# Patient Record
Sex: MALE | Race: WHITE | NOT HISPANIC OR LATINO
[De-identification: names, ages, dates, MRNs, and addresses within clinical notes are randomized per-mention and may not be internally consistent; named-entity substitution may affect disease eponyms.]

---

## 2020-10-20 ENCOUNTER — TRANSCRIPTION ENCOUNTER (OUTPATIENT)
Age: 80
End: 2020-10-20

## 2021-03-11 ENCOUNTER — TRANSCRIPTION ENCOUNTER (OUTPATIENT)
Age: 81
End: 2021-03-11

## 2023-02-09 ENCOUNTER — NON-APPOINTMENT (OUTPATIENT)
Age: 83
End: 2023-02-09

## 2023-02-10 ENCOUNTER — TRANSCRIPTION ENCOUNTER (OUTPATIENT)
Age: 83
End: 2023-02-10

## 2023-02-11 ENCOUNTER — APPOINTMENT (OUTPATIENT)
Dept: DISASTER EMERGENCY | Facility: HOSPITAL | Age: 83
End: 2023-02-11

## 2023-02-11 ENCOUNTER — OUTPATIENT (OUTPATIENT)
Dept: OUTPATIENT SERVICES | Facility: HOSPITAL | Age: 83
LOS: 1 days | End: 2023-02-11

## 2023-02-11 VITALS
RESPIRATION RATE: 18 BRPM | WEIGHT: 274.92 LBS | OXYGEN SATURATION: 94 % | HEART RATE: 96 BPM | HEIGHT: 68 IN | TEMPERATURE: 99 F | DIASTOLIC BLOOD PRESSURE: 77 MMHG | SYSTOLIC BLOOD PRESSURE: 135 MMHG

## 2023-02-11 VITALS
DIASTOLIC BLOOD PRESSURE: 72 MMHG | OXYGEN SATURATION: 94 % | RESPIRATION RATE: 19 BRPM | TEMPERATURE: 98 F | HEART RATE: 80 BPM | SYSTOLIC BLOOD PRESSURE: 131 MMHG

## 2023-02-11 DIAGNOSIS — U07.1 COVID-19: ICD-10-CM

## 2023-02-11 LAB
ALBUMIN SERPL ELPH-MCNC: 4.2 G/DL — SIGNIFICANT CHANGE UP (ref 3.3–5)
ALP SERPL-CCNC: 38 U/L — LOW (ref 40–120)
ALT FLD-CCNC: 10 U/L — SIGNIFICANT CHANGE UP (ref 4–41)
ANION GAP SERPL CALC-SCNC: 13 MMOL/L — SIGNIFICANT CHANGE UP (ref 7–14)
AST SERPL-CCNC: 14 U/L — SIGNIFICANT CHANGE UP (ref 4–40)
BILIRUB SERPL-MCNC: 1.7 MG/DL — HIGH (ref 0.2–1.2)
BUN SERPL-MCNC: 22 MG/DL — SIGNIFICANT CHANGE UP (ref 7–23)
CALCIUM SERPL-MCNC: 8.6 MG/DL — SIGNIFICANT CHANGE UP (ref 8.4–10.5)
CHLORIDE SERPL-SCNC: 99 MMOL/L — SIGNIFICANT CHANGE UP (ref 98–107)
CO2 SERPL-SCNC: 25 MMOL/L — SIGNIFICANT CHANGE UP (ref 22–31)
CREAT SERPL-MCNC: 1.27 MG/DL — SIGNIFICANT CHANGE UP (ref 0.5–1.3)
EGFR: 57 ML/MIN/1.73M2 — LOW
GLUCOSE SERPL-MCNC: 177 MG/DL — HIGH (ref 70–99)
INR BLD: 1.43 RATIO — HIGH (ref 0.88–1.16)
POTASSIUM SERPL-MCNC: 4.7 MMOL/L — SIGNIFICANT CHANGE UP (ref 3.5–5.3)
POTASSIUM SERPL-SCNC: 4.7 MMOL/L — SIGNIFICANT CHANGE UP (ref 3.5–5.3)
PROT SERPL-MCNC: 7.1 G/DL — SIGNIFICANT CHANGE UP (ref 6–8.3)
PROTHROM AB SERPL-ACNC: 16.6 SEC — HIGH (ref 10.5–13.4)
SODIUM SERPL-SCNC: 137 MMOL/L — SIGNIFICANT CHANGE UP (ref 135–145)

## 2023-02-11 RX ORDER — SODIUM CHLORIDE 9 MG/ML
100 INJECTION INTRAMUSCULAR; INTRAVENOUS; SUBCUTANEOUS
Refills: 0 | Status: COMPLETED | OUTPATIENT
Start: 2023-02-11 | End: 2023-02-11

## 2023-02-11 RX ORDER — REMDESIVIR 5 MG/ML
200 INJECTION INTRAVENOUS ONCE
Refills: 0 | Status: COMPLETED | OUTPATIENT
Start: 2023-02-11 | End: 2023-02-11

## 2023-02-11 RX ADMIN — REMDESIVIR 200 MILLIGRAM(S): 5 INJECTION INTRAVENOUS at 10:50

## 2023-02-11 RX ADMIN — SODIUM CHLORIDE 310 MILLILITER(S): 9 INJECTION INTRAMUSCULAR; INTRAVENOUS; SUBCUTANEOUS at 10:52

## 2023-02-11 NOTE — CHART NOTE - NSCHARTNOTEFT_GEN_A_CORE
DAY #1 Remdesivir    Cc:  Patient here for Remdesivir IV Treatment for Covid-19     Patient presents for first day of Remdesivir infusion.    Patient has history of DVT, DM, sleep apnea, SOB with exertion, pulmonary embolism, HTN, hyperlipidemia. Patient takin Xarelto. vaccinated with pfizer, no booster    Clinical History and positive direct covid test confirmed with patient and via [facility] Clinical Viewer in [facility] on 2/10    Patient symptom start date confirmed as 2/8/23. Symptom: cough, sore throat    Patient oriented to Remdesivir treatment  as well as the need to draw and follow up labs. Consent for treatment was obtained and patient given Remdesivir ([employer]) Patient Information Sheet ([email or web address])      Drug-Drug interactions were checked via [employer] Covid 19 Interactions  ([email or web address]-qyusucepgkeonstf.org/) Yes    Patient has no kidney disease    Patient has no liver disease     Patient is not Pregnant Y/N      Based on high-risk status above, confirmed with patient that they have had a conversation with their referring provider regarding the risks and benefits of Remdesivir Infusion.  All questions answered.  Yes    Pre-infusion vitals obtained  135/77,89, 98.8, 94%    Patient determined to be in no acute respiratory distress and clinically stable for infusion. Yes    Peripheral IV placed and specimens collected and labelled for pick-up. Yes    Patient was infused Remdesivir over 30 minute period, vitals signed checked at 15 minutes and monitored for 1-hour post-infusion. Yes    There is no complications during or post-infusion:       Patient tolerated infusion and given post infusion information including number to call with any concerning symptoms and instructions for follow-up for the next 2 days.

## 2023-02-12 ENCOUNTER — OUTPATIENT (OUTPATIENT)
Dept: OUTPATIENT SERVICES | Facility: HOSPITAL | Age: 83
LOS: 1 days | End: 2023-02-12

## 2023-02-12 ENCOUNTER — APPOINTMENT (OUTPATIENT)
Dept: DISASTER EMERGENCY | Facility: HOSPITAL | Age: 83
End: 2023-02-12

## 2023-02-12 VITALS
RESPIRATION RATE: 16 BRPM | SYSTOLIC BLOOD PRESSURE: 95 MMHG | TEMPERATURE: 99 F | OXYGEN SATURATION: 95 % | DIASTOLIC BLOOD PRESSURE: 61 MMHG | HEART RATE: 82 BPM

## 2023-02-12 VITALS
SYSTOLIC BLOOD PRESSURE: 136 MMHG | TEMPERATURE: 98 F | OXYGEN SATURATION: 94 % | DIASTOLIC BLOOD PRESSURE: 77 MMHG | RESPIRATION RATE: 18 BRPM | HEART RATE: 95 BPM

## 2023-02-12 DIAGNOSIS — U07.1 COVID-19: ICD-10-CM

## 2023-02-12 RX ORDER — SODIUM CHLORIDE 9 MG/ML
100 INJECTION INTRAMUSCULAR; INTRAVENOUS; SUBCUTANEOUS
Refills: 0 | Status: DISCONTINUED | OUTPATIENT
Start: 2023-02-12 | End: 2023-02-26

## 2023-02-12 RX ORDER — REMDESIVIR 5 MG/ML
100 INJECTION INTRAVENOUS ONCE
Refills: 0 | Status: COMPLETED | OUTPATIENT
Start: 2023-02-12 | End: 2023-02-12

## 2023-02-12 RX ADMIN — REMDESIVIR 200 MILLIGRAM(S): 5 INJECTION INTRAVENOUS at 11:14

## 2023-02-12 NOTE — CHART NOTE - NSCHARTNOTEFT_GEN_A_CORE
Day #2 Remdesivir     82y Male with PMHx of DVT, DM, sleep apnea, SOB with exertion, pulmonary embolism (on Xarelto), HTN, hyperlipidemia,  and recent dx of COVID 19+ who presents for second day of three days of outpatient Remdesivir Infusion. Patient has been screened and was deemed to be a candidate.    LABORATORY FINDINGS:    PT/INR - ( 11 Feb 2023 11:39 )   PT: 16.6 sec;   INR: 1.43 ratio    02-11    137  |  99  |  22  ----------------------------<  177<H>  4.7   |  25  |  1.27    Ca    8.6      11 Feb 2023 11:39    TPro  7.1  /  Alb  4.2  /  TBili  1.7<H>  /  DBili  x   /  AST  14  /  ALT  10  /  AlkPhos  38<L>  02-11       Previous days labs reviewed, and  found to be ABNORMAL (elevated bilirubin) and escalated to Dr. Renae Mckeon (infusion clinical leadership).     Pt was approved to proceed with infusion without additional labs.     Pt advised to follow-up with provider for further assessment and treatment recommendations.     Pt. assessed for any new symptoms that might require additional labs (abdominal pain, fluid overload)    New symptoms are not present.     Pt. approved to proceed with infusion.     Consent for treatment was obtained.    Pre-infusion vitals obtained.    Vital Signs Last 24 Hrs  T(C): 36.8 (11 Feb 2023 12:20), Max: 37.6 (11 Feb 2023 11:05)  T(F): 98.2 (11 Feb 2023 12:20), Max: 99.6 (11 Feb 2023 11:05)  HR: 80 (11 Feb 2023 12:20) (79 - 96)  BP: 131/72 (11 Feb 2023 12:20) (128/66 - 135/77)  BP(mean): --  RR: 19 (11 Feb 2023 12:20) (18 - 19)  SpO2: 94% (11 Feb 2023 12:20) (94% - 94%)      Physical Exam/findings:    PE:   Appearance: NAD	  HEENT:  NC/AT  Cardiovascular:  No edema  Respiratory: no use of accessory muscles  Gastrointestinal:  non-distended   Skin: warm and dry  Neurologic: Non-focal  Extremities: Normal range of motion    Patient determined to be in no acute respiratory distress and clinically stable for infusion.     Peripheral IV placed and specimens collected and labelled for pick-up.     Patient was infused Remdesivir over 30 minute period, vitals signed checked at 15 minutes and monitored for 1-hour post-infusion.     Complications during or post-infusion:   no    Patient tolerated infusion and given post infusion information including number to call with any concerning symptoms and instructions for follow-up for the next day. Day #2 Remdesivir     82y Male with PMHx of DVT, DM, sleep apnea, SOB with exertion, pulmonary embolism (on Xarelto), HTN, hyperlipidemia,  and recent dx of COVID 19+ who presents for second day of three days of outpatient Remdesivir Infusion. Patient has been screened and was deemed to be a candidate.    LABORATORY FINDINGS:    PT/INR - ( 11 Feb 2023 11:39 )   PT: 16.6 sec;   INR: 1.43 ratio    02-11    137  |  99  |  22  ----------------------------<  177<H>  4.7   |  25  |  1.27    Ca    8.6      11 Feb 2023 11:39    TPro  7.1  /  Alb  4.2  /  TBili  1.7<H>  /  DBili  x   /  AST  14  /  ALT  10  /  AlkPhos  38<L>  02-11       Previous days labs reviewed, and  found to be ABNORMAL (elevated bilirubin) and escalated to Dr. Renae Mckeon (infusion clinical leadership).     Pt was approved to proceed with infusion without additional labs.     Pt advised to follow-up with provider for further assessment and treatment recommendations.     Pt. assessed for any new symptoms that might require additional labs (abdominal pain, fluid overload)    New symptoms are not present.     Pt. approved to proceed with infusion.     Consent for treatment was obtained.    Pre-infusion vitals obtained.      T(C): 36.9 (12 Feb 2023 10:30), Max: 37.6 (11 Feb 2023 11:05)  T(F): 98.5 (12 Feb 2023 10:30), Max: 99.6 (11 Feb 2023 11:05)  HR: 95 (12 Feb 2023 10:30) (79 - 95)  BP: 136/77 (12 Feb 2023 10:30) (128/66 - 136/77)  BP(mean): --  RR: 18 (12 Feb 2023 10:30) (18 - 19)  SpO2: 94% (12 Feb 2023 10:30) (94% - 94%)    Physical Exam/findings:    PE:   Appearance: NAD	  HEENT:  NC/AT  Cardiovascular:  No edema  Respiratory: no use of accessory muscles  Gastrointestinal:  non-distended   Skin: warm and dry  Neurologic: Non-focal  Extremities: Normal range of motion    Patient determined to be in no acute respiratory distress and clinically stable for infusion.     Peripheral IV placed and specimens collected and labelled for pick-up.     Patient was infused Remdesivir over 30 minute period, vitals signed checked at 15 minutes and monitored for 1-hour post-infusion.     Complications during or post-infusion:   no    Patient tolerated infusion and given post infusion information including number to call with any concerning symptoms and instructions for follow-up for the next day.

## 2023-02-13 ENCOUNTER — APPOINTMENT (OUTPATIENT)
Dept: DISASTER EMERGENCY | Facility: HOSPITAL | Age: 83
End: 2023-02-13

## 2023-02-13 ENCOUNTER — OUTPATIENT (OUTPATIENT)
Dept: OUTPATIENT SERVICES | Facility: HOSPITAL | Age: 83
LOS: 1 days | End: 2023-02-13

## 2023-02-13 VITALS
TEMPERATURE: 99 F | RESPIRATION RATE: 17 BRPM | SYSTOLIC BLOOD PRESSURE: 126 MMHG | HEART RATE: 74 BPM | OXYGEN SATURATION: 94 %

## 2023-02-13 VITALS
SYSTOLIC BLOOD PRESSURE: 132 MMHG | HEART RATE: 78 BPM | DIASTOLIC BLOOD PRESSURE: 76 MMHG | RESPIRATION RATE: 19 BRPM | TEMPERATURE: 98 F | OXYGEN SATURATION: 94 %

## 2023-02-13 DIAGNOSIS — U07.1 COVID-19: ICD-10-CM

## 2023-02-13 RX ORDER — SODIUM CHLORIDE 9 MG/ML
100 INJECTION INTRAMUSCULAR; INTRAVENOUS; SUBCUTANEOUS
Refills: 0 | Status: COMPLETED | OUTPATIENT
Start: 2023-02-13 | End: 2023-02-13

## 2023-02-13 RX ORDER — REMDESIVIR 5 MG/ML
100 INJECTION INTRAVENOUS ONCE
Refills: 0 | Status: COMPLETED | OUTPATIENT
Start: 2023-02-13 | End: 2023-02-13

## 2023-02-13 RX ADMIN — SODIUM CHLORIDE 310 MILLILITER(S): 9 INJECTION INTRAMUSCULAR; INTRAVENOUS; SUBCUTANEOUS at 10:41

## 2023-02-13 RX ADMIN — REMDESIVIR 200 MILLIGRAM(S): 5 INJECTION INTRAVENOUS at 10:40

## 2023-02-13 NOTE — CHART NOTE - PRIOR COVID TREATMENT
DAY #3 Remdesivir    Cc:  Patient here for Remdesivir IV Treatment for Covid-19     Patient presents for day 3 of Remdesivir infusion.      Patient has history of DVT, DM, sleep apnea, SOB with exertion, pulmonary embolism, HTN, hyperlipidemia. Patient taking Xarelto. vaccinated with pfizer, no booster    Clinical History and positive direct covid test confirmed with patient and via [facility] Clinical Viewer in [facility] on 2/10    Patient symptom start date confirmed as 2/8/23. Symptom: cough, sore throat DAY #3 Remdesivir    Cc:  Patient here for Remdesivir IV Treatment for Covid-19     Patient presents for day 3 of Remdesivir infusion.      Patient has history of DVT, DM, sleep apnea, SOB with exertion, pulmonary embolism, HTN, hyperlipidemia. Patient taking Xarelto. vaccinated with pfizer, no booster    Clinical History and positive direct covid test confirmed with patient and via [facility] Clinical Viewer in [facility] on 2/10    Patient symptom start date confirmed as 2/8/23. Symptom: cough, sore throat    CC: Monoclonal Antibody Infusion/COVID 19 Positive  82yMale    exam/findings:  T(C): 37.3 (02-12-23 @ 12:22), Max: 37.3 (02-12-23 @ 12:22)  HR: 82 (02-12-23 @ 12:22) (82 - 96)  BP: 95/61 (02-12-23 @ 12:22) (95/61 - 136/77)  RR: 16 (02-12-23 @ 12:22) (16 - 18)  SpO2: 95% (02-12-23 @ 12:22) (94% - 96%)      PE:   Appearance: NAD	  HEENT:   Normal oral mucosa,   Lymphatic: No lymphadenopathy  Cardiovascular: Normal S1 S2, No JVD, No murmurs, No edema  Respiratory: Lungs clear to auscultation	  Gastrointestinal:  Soft, Non-tender, + BS	  Skin: warm and dry  Neurologic: Non-focal  Extremities: Normal range of motion,

## 2023-02-13 NOTE — CHART NOTE - NSCHARTNOTEFT_GEN_A_CORE
Day #3 Remdesivir     Patient presents for third day of three days of outpatient Remdesivir Infusion.     Labs were assessed and discussed with Dr. Mckeon and pt on Day 2     Pt. assessed for any new symptoms that might require additional labs (abdominal pain, fluid overload)     New symptoms are present/ are not present  ***XXX***    Yes: free text New clinical symptoms escalated to referring provider ***[id number]*** and   ***XXXXXX*** (infusion clinical leadership).     Pt. approved/not approved to proceed with infusion.  ***XXX***    Consent for treatment was obtained      Pre-infusion vitals obtained      Patient determined to be in no acute respiratory distress and clinically stable for infusion.     Peripheral IV placed and specimens collected and labelled for pick-up.     Patient was infused Remdesivir over 30 minute period, vitals signed checked at 15 minutes and monitored for 1-hour post-infusion.     Complications during or post-infusion:    yes/no FREE TEXT ***XXX***    Patient tolerated infusion and given post infusion information including number to call with any concerning symptoms. Day #3 Remdesivir     Patient presents for third day of three days of outpatient Remdesivir Infusion.     Labs were assessed and discussed with Dr. Mckeon and pt on Day 2     Pt. assessed for any new symptoms that might require additional labs (abdominal pain, fluid overload)     New symptoms are not present     Pt. approved to proceed with infusion.     Consent for treatment was obtained      Pre-infusion vitals obtained      Patient determined to be in no acute respiratory distress and clinically stable for infusion.     Peripheral IV placed and specimens collected and labelled for pick-up.     Patient was infused Remdesivir over 30 minute period, vitals signed checked at 15 minutes and monitored for 1-hour post-infusion.     Complications during or post-infusion:    yes/no FREE TEXT ***XXX***    Patient tolerated infusion and given post infusion information including number to call with any concerning symptoms.

## 2023-05-14 ENCOUNTER — NON-APPOINTMENT (OUTPATIENT)
Age: 83
End: 2023-05-14

## 2023-11-11 ENCOUNTER — NON-APPOINTMENT (OUTPATIENT)
Age: 83
End: 2023-11-11